# Patient Record
Sex: MALE | Race: WHITE
[De-identification: names, ages, dates, MRNs, and addresses within clinical notes are randomized per-mention and may not be internally consistent; named-entity substitution may affect disease eponyms.]

---

## 2017-12-27 ENCOUNTER — HOSPITAL ENCOUNTER (OUTPATIENT)
Dept: HOSPITAL 75 - PREOP | Age: 62
End: 2017-12-27
Attending: SURGERY
Payer: COMMERCIAL

## 2017-12-27 VITALS — WEIGHT: 185 LBS | HEIGHT: 69 IN | BODY MASS INDEX: 27.4 KG/M2

## 2017-12-27 DIAGNOSIS — L05.91: ICD-10-CM

## 2017-12-27 DIAGNOSIS — Z01.818: Primary | ICD-10-CM

## 2017-12-28 ENCOUNTER — HOSPITAL ENCOUNTER (OUTPATIENT)
Dept: HOSPITAL 75 - SDC | Age: 62
Discharge: HOME | End: 2017-12-28
Attending: SURGERY
Payer: COMMERCIAL

## 2017-12-28 VITALS — WEIGHT: 185 LBS | HEIGHT: 69 IN | BODY MASS INDEX: 27.4 KG/M2

## 2017-12-28 VITALS — SYSTOLIC BLOOD PRESSURE: 166 MMHG | DIASTOLIC BLOOD PRESSURE: 107 MMHG

## 2017-12-28 VITALS — SYSTOLIC BLOOD PRESSURE: 157 MMHG | DIASTOLIC BLOOD PRESSURE: 98 MMHG

## 2017-12-28 VITALS — DIASTOLIC BLOOD PRESSURE: 98 MMHG | SYSTOLIC BLOOD PRESSURE: 157 MMHG

## 2017-12-28 DIAGNOSIS — I10: ICD-10-CM

## 2017-12-28 DIAGNOSIS — Z87.891: ICD-10-CM

## 2017-12-28 DIAGNOSIS — Z79.899: ICD-10-CM

## 2017-12-28 DIAGNOSIS — L05.91: Primary | ICD-10-CM

## 2017-12-28 DIAGNOSIS — K21.9: ICD-10-CM

## 2017-12-28 DIAGNOSIS — G43.909: ICD-10-CM

## 2017-12-28 PROCEDURE — 87081 CULTURE SCREEN ONLY: CPT

## 2017-12-28 RX ADMIN — SODIUM CHLORIDE, SODIUM LACTATE, POTASSIUM CHLORIDE, AND CALCIUM CHLORIDE PRN MLS/HR: 600; 310; 30; 20 INJECTION, SOLUTION INTRAVENOUS at 15:46

## 2017-12-28 RX ADMIN — SODIUM CHLORIDE, SODIUM LACTATE, POTASSIUM CHLORIDE, AND CALCIUM CHLORIDE PRN MLS/HR: 600; 310; 30; 20 INJECTION, SOLUTION INTRAVENOUS at 14:39

## 2017-12-28 NOTE — PROGRESS NOTE-POST OPERATIVE
Post-Operative Progess Note


Surgeon (s)/Assistant (s)


Surgeon


RAÚL MAYEN DO


Assistant:  na





Pre-Operative Diagnosis


cyst pilonidal





Post-Operative Diagnosis





same





Procedure & Operative Findings


Date of Procedure


12/28/17


Procedure Performed/Findings


excision pilonidal cyst 3.2x2.5x2 cm


Anesthesia Type


gen





Estimated Blood Loss


Estimated blood loss (mL):  minimal





Specimens/Packing


Specimens Removed


cyst











RAÚL MAYEN DO Dec 28, 2017 15:31

## 2017-12-28 NOTE — PROGRESS NOTE-PRE OPERATIVE
Pre-Operative Progress Note


H&P Reviewed


The H&P was reviewed, patient examined and no changes noted.


Date Seen by Provider:  Dec 28, 2017


Time Seen by Provider:  14:34


Date H&P Reviewed:  Dec 28, 2017


Time H&P Reviewed:  14:34


Pre-Operative Diagnosis:  cyst pilonidal











RAÚL MAYEN DO Dec 28, 2017 14:35

## 2017-12-28 NOTE — DISCHARGE INST-SIMPLE/STANDARD
Discharge Inst-Standard


Discharge Medications


New, Converted or Re-Newed RX:  RX on Chart





Patient Instructions/Follow Up


Plan of Care/Instructions/FU:  


12-14 days for suture removal


Activity as Tolerated:  No


Discharge Diet:  Regular Diet


Other Inst to Patient


Follow up Appt:


Make appointment for 12-14 days





Instructions:


No lifting greater than 10 pounds.


No strenuous activity. 


May shower in 24 hours, no tub bath or soaking.


Use incentive spirometer at home as directed.


No Smoking





Skin/Wound Care:


Keep area clean and dry.  Remove dressing daily.





Symptoms to Report:


Appetite Changes, Extremity Discoloration, Numbness/Tingling, Swelling Increased

, Bleeding Excessive, Eyesight Changes, Pain Increased, Urine Color Change, 

Constipation(Persistent), Fever over 101 degree F, Pain/Pressure in chest, 

Urinating Difficulty, Cough Up/Vomit Blood, Heart Beat Irreg/Pounding, Pain/

Pressure in jaw, Vaginal Bleeding Increase, Cramps in feet or legs, 

Lightheadedness, Pain/Pressure in shoulder, Diarrhea(Persistent), Memory 

Changes Suddenly, Questions/Concerns, Weight gain consecutive days, Dizziness/

Fainting, Nausea/Vomiting, Shortness of Breath, Weight gain over 2 pounds








If questions or concerns contact your physician 


Or seek help at emergency department.











RAÚL MAYEN DO Dec 28, 2017 15:35

## 2017-12-28 NOTE — OPERATIVE REPORT
DATE OF SERVICE:  12/28/2017



PREOPERATIVE DIAGNOSIS:

Pilonidal cyst.



POSTOPERATIVE DIAGNOSIS:

Pilonidal cyst.



PROCEDURE:

Excision of pilonidal cyst 3.2 x 2.5 x 2 cm with layered closure.



SURGEON:

Raúl Butler DO.



ANESTHESIA:

General.



ESTIMATED BLOOD LOSS:

Minimal.



COMPLICATIONS:

None.



INDICATIONS:

The patient is a 62-year-old male with a pilonidal cyst.  This is causing some

pain and discomfort at times.  Also, has drained several times previously.  He

understands risks and benefits of procedure and wished to proceed with

procedure.  Consent was signed and on the chart.



DESCRIPTION OF PROCEDURE:

The patient was taken to the operating suite.  He was prepped and draped in

sterile fashion in the prone position.  An incision was made just to the left of

midline and the cyst was dissected around with cautery dissection.  Anteriorly,

a small part was ruptured with dissection, but the entirety of it was removed. 

This was taken down to the sacral fascia and removed.  No other part of the cyst

was visualized within the incision or cavity or wound.  The wound was then

irrigated with copious amounts of irrigation.  Hemostasis had been achieved. 

The deep subcutaneous tissues were then reapproximated using 3-0 Vicryl.  The

subcutaneous tissues were reapproximated using 3-0 Vicryl.  Skin was then closed

with vertical mattress 2-0 Prolenes.  The area was then washed and dried,

sterile bandages were applied.  The patient tolerated the procedure well without

any complications and was taken to recovery room in stable condition.





Job ID: [id number]

DocumentID: [id number]

Dictated Date:  12/28/2017 17:16:24

Transcription Date: 12/28/2017 23:06:50

Dictated By: RAÚL BUTLER DO

## 2018-12-17 ENCOUNTER — HOSPITAL ENCOUNTER (OUTPATIENT)
Dept: HOSPITAL 75 - CARD | Age: 63
End: 2018-12-17
Attending: INTERNAL MEDICINE
Payer: COMMERCIAL

## 2018-12-17 VITALS — SYSTOLIC BLOOD PRESSURE: 162 MMHG | DIASTOLIC BLOOD PRESSURE: 109 MMHG

## 2018-12-17 VITALS — SYSTOLIC BLOOD PRESSURE: 154 MMHG | DIASTOLIC BLOOD PRESSURE: 92 MMHG

## 2018-12-17 DIAGNOSIS — R07.9: Primary | ICD-10-CM

## 2018-12-17 DIAGNOSIS — I10: ICD-10-CM

## 2018-12-17 PROCEDURE — 93017 CV STRESS TEST TRACING ONLY: CPT

## 2018-12-17 PROCEDURE — 78452 HT MUSCLE IMAGE SPECT MULT: CPT

## 2018-12-17 NOTE — STRESS TEST
DATE OF SERVICE:  12/17/2018



NUCLEAR MYOVIEW REPORT



REFERRING PHYSICIAN:

Dr. Hernandez.



In summary, the patient was injected with 10.7 mCi of technetium-99 Myoview and

the resting images were obtained.  Then, the patient received a 31.4 mCi of

technetium-99 with peak stress level and the stress images were obtained.  The

stress and resting images were compared in the short axis, horizontal long axis,

and vertical long axis views.  Review of the images showed diaphragmatic

attenuation with mild decreased uptake at the mid to apical inferior wall and

inferolateral wall with subtle reversibility probably due to the extracardiac

attenuation.  SSS is 5, SDS 4, TID value 1.0.  On the gated images, the left

ventricle appeared to be in normal size with mild diffuse left ventricular

hypokinesia, calculated ejection fraction 44%.



CONCLUSION:

1.  Diaphragmatic attenuation with mild decreased uptake at the mid to apical

inferior wall and inferolateral wall with mild reversibility, probably secondary

to the extracardiac attenuation.

2.  Normal left ventricular size with mild diffuse left ventricular hypokinesia,

calculated ejection fraction 44%.





Job ID: [id number]

DocumentID: [id number]

Dictated Date:  12/17/2018 12:42:37

Transcription Date: 12/17/2018 13:05:08

Dictated By: SARAH KIRKLAND MD

## 2019-01-25 ENCOUNTER — HOSPITAL ENCOUNTER (OUTPATIENT)
Dept: HOSPITAL 75 - RAD | Age: 64
End: 2019-01-25
Attending: INTERNAL MEDICINE
Payer: COMMERCIAL

## 2019-01-25 DIAGNOSIS — M54.2: ICD-10-CM

## 2019-01-25 DIAGNOSIS — M41.85: Primary | ICD-10-CM

## 2019-01-25 PROCEDURE — 72040 X-RAY EXAM NECK SPINE 2-3 VW: CPT

## 2019-01-25 PROCEDURE — 72072 X-RAY EXAM THORAC SPINE 3VWS: CPT

## 2019-01-25 NOTE — DIAGNOSTIC IMAGING REPORT
CLINICAL INDICATION: Patient complains of back and neck pain

mostly on the right side. Pain appears to be getting worse.



EXAM: X-ray of the cervical spine, three views including odontoid

views.



COMPARISON: X-ray of the cervical spine dated 06/05/2011.



FINDINGS:



There is no acute fracture or dislocation. There is no

significant change to the small spurs anteriorly at the C5-C6

levels. The intervertebral disc heights are well maintained.

There is no prevertebral soft tissue swelling. Odontoid views are

unremarkable.



IMPRESSION:



Stable minimal cervical spine degenerative disease with minimal

anterior spurring at the C5-C6 level.



  Dictated on workstation # [id number]

## 2019-01-25 NOTE — DIAGNOSTIC IMAGING REPORT
CLINICAL INDICATION: Patient complains of back and neck pain

mostly on right side. Pain is getting worse.



EXAM: X-ray of the thoracic spine, three views including

swimmer's view.



COMPARISON: CT scan of the abdomen and pelvis dated 09/28/2011.



FINDINGS:



Limited visualization of the upper thoracic spine due to

overlapping bone and soft tissue. Thoracic spine shows no acute

fracture or dislocation. Intervertebral disc heights and

vertebral body heights are within normal limits. There is no

significant change to the mild dextrorotoscoliosis of the

thoracolumbar spine. This is best seen on the  image of the

comparison CT scan.



IMPRESSION:

 

1: There is no acute thoracic spine fracture or dislocation.



2: Again seen mild dextrorotoscoliosis of the thoracolumbar

spine.



  Dictated on workstation # [id number]

## 2021-08-11 ENCOUNTER — HOSPITAL ENCOUNTER (OUTPATIENT)
Dept: HOSPITAL 75 - PREOP | Age: 66
Discharge: HOME | End: 2021-08-11
Attending: OTOLARYNGOLOGY
Payer: COMMERCIAL

## 2021-08-11 VITALS — BODY MASS INDEX: 25.96 KG/M2 | WEIGHT: 175.27 LBS | HEIGHT: 69.02 IN

## 2021-08-11 DIAGNOSIS — J38.3: ICD-10-CM

## 2021-08-11 DIAGNOSIS — Z01.812: Primary | ICD-10-CM

## 2021-08-11 DIAGNOSIS — Z01.810: ICD-10-CM

## 2021-08-11 DIAGNOSIS — Z01.811: ICD-10-CM

## 2021-08-11 DIAGNOSIS — Z20.822: ICD-10-CM

## 2021-08-11 PROCEDURE — 87635 SARS-COV-2 COVID-19 AMP PRB: CPT

## 2021-08-12 ENCOUNTER — HOSPITAL ENCOUNTER (OUTPATIENT)
Dept: HOSPITAL 75 - SDC | Age: 66
Discharge: HOME | End: 2021-08-12
Attending: OTOLARYNGOLOGY
Payer: COMMERCIAL

## 2021-08-12 VITALS — SYSTOLIC BLOOD PRESSURE: 152 MMHG | DIASTOLIC BLOOD PRESSURE: 94 MMHG

## 2021-08-12 VITALS — DIASTOLIC BLOOD PRESSURE: 102 MMHG | SYSTOLIC BLOOD PRESSURE: 155 MMHG

## 2021-08-12 VITALS — SYSTOLIC BLOOD PRESSURE: 152 MMHG | DIASTOLIC BLOOD PRESSURE: 102 MMHG

## 2021-08-12 VITALS — DIASTOLIC BLOOD PRESSURE: 96 MMHG | SYSTOLIC BLOOD PRESSURE: 166 MMHG

## 2021-08-12 VITALS — HEIGHT: 69.02 IN | BODY MASS INDEX: 25.96 KG/M2 | WEIGHT: 175.27 LBS

## 2021-08-12 VITALS — DIASTOLIC BLOOD PRESSURE: 108 MMHG | SYSTOLIC BLOOD PRESSURE: 133 MMHG

## 2021-08-12 VITALS — SYSTOLIC BLOOD PRESSURE: 155 MMHG | DIASTOLIC BLOOD PRESSURE: 100 MMHG

## 2021-08-12 VITALS — SYSTOLIC BLOOD PRESSURE: 149 MMHG | DIASTOLIC BLOOD PRESSURE: 96 MMHG

## 2021-08-12 VITALS — DIASTOLIC BLOOD PRESSURE: 96 MMHG | SYSTOLIC BLOOD PRESSURE: 153 MMHG

## 2021-08-12 VITALS — SYSTOLIC BLOOD PRESSURE: 138 MMHG | DIASTOLIC BLOOD PRESSURE: 88 MMHG

## 2021-08-12 VITALS — SYSTOLIC BLOOD PRESSURE: 163 MMHG | DIASTOLIC BLOOD PRESSURE: 106 MMHG

## 2021-08-12 VITALS — DIASTOLIC BLOOD PRESSURE: 95 MMHG | SYSTOLIC BLOOD PRESSURE: 146 MMHG

## 2021-08-12 DIAGNOSIS — Z79.899: ICD-10-CM

## 2021-08-12 DIAGNOSIS — J38.7: Primary | ICD-10-CM

## 2021-08-12 DIAGNOSIS — I10: ICD-10-CM

## 2021-08-12 DIAGNOSIS — K21.9: ICD-10-CM

## 2021-08-12 LAB
BASOPHILS # BLD AUTO: 0.1 10^3/UL (ref 0–0.1)
BASOPHILS NFR BLD AUTO: 1 % (ref 0–10)
BUN/CREAT SERPL: 11
CALCIUM SERPL-MCNC: 9.8 MG/DL (ref 8.5–10.1)
CHLORIDE SERPL-SCNC: 104 MMOL/L (ref 98–107)
CO2 SERPL-SCNC: 25 MMOL/L (ref 21–32)
CREAT SERPL-MCNC: 0.98 MG/DL (ref 0.6–1.3)
EOSINOPHIL # BLD AUTO: 0.1 10^3/UL (ref 0–0.3)
EOSINOPHIL NFR BLD AUTO: 1 % (ref 0–10)
GFR SERPLBLD BASED ON 1.73 SQ M-ARVRAT: 77 ML/MIN
GLUCOSE SERPL-MCNC: 102 MG/DL (ref 70–105)
HCT VFR BLD CALC: 46 % (ref 40–54)
HGB BLD-MCNC: 16 G/DL (ref 13.3–17.7)
LYMPHOCYTES # BLD AUTO: 1.6 10^3/UL (ref 1–4)
LYMPHOCYTES NFR BLD AUTO: 23 % (ref 12–44)
MANUAL DIFFERENTIAL PERFORMED BLD QL: NO
MCH RBC QN AUTO: 31 PG (ref 25–34)
MCHC RBC AUTO-ENTMCNC: 35 G/DL (ref 32–36)
MCV RBC AUTO: 88 FL (ref 80–99)
MONOCYTES # BLD AUTO: 0.6 10^3/UL (ref 0–1)
MONOCYTES NFR BLD AUTO: 9 % (ref 0–12)
NEUTROPHILS # BLD AUTO: 4.5 10^3/UL (ref 1.8–7.8)
NEUTROPHILS NFR BLD AUTO: 65 % (ref 42–75)
PLATELET # BLD: 290 10^3/UL (ref 130–400)
PMV BLD AUTO: 10.3 FL (ref 9–12.2)
POTASSIUM SERPL-SCNC: 3.2 MMOL/L (ref 3.6–5)
SODIUM SERPL-SCNC: 142 MMOL/L (ref 135–145)
WBC # BLD AUTO: 6.9 10^3/UL (ref 4.3–11)

## 2021-08-12 PROCEDURE — 87081 CULTURE SCREEN ONLY: CPT

## 2021-08-12 PROCEDURE — 71046 X-RAY EXAM CHEST 2 VIEWS: CPT

## 2021-08-12 PROCEDURE — 93005 ELECTROCARDIOGRAM TRACING: CPT

## 2021-08-12 PROCEDURE — 85025 COMPLETE CBC W/AUTO DIFF WBC: CPT

## 2021-08-12 PROCEDURE — 36415 COLL VENOUS BLD VENIPUNCTURE: CPT

## 2021-08-12 PROCEDURE — 80048 BASIC METABOLIC PNL TOTAL CA: CPT

## 2021-08-12 NOTE — DIAGNOSTIC IMAGING REPORT
INDICATION: Preoperative evaluation



PA and lateral views of the chest are obtained. There is no

previous study available at this time for comparison.



Heart size and pulmonary vascularity are within normal limits.

Linear density in the right lung base likely represents

atelectasis or scarring. There is no evidence of consolidation or

significant pleural fluid.



IMPRESSION: Minimal basilar atelectasis or scarring without other

evidence for acute abnormality.



Dictated by: 



  Dictated on workstation # [id number]

## 2021-08-12 NOTE — PROGRESS NOTE-PRE OPERATIVE
Pre-Operative Progress Note


H&P Reviewed


The H&P was reviewed, patient examined and no changes noted.


Date Seen by Provider:  Aug 12, 2021


Time Seen by Provider:  07:00


Date H&P Reviewed:  Aug 12, 2021


Time H&P Reviewed:  07:00


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











ELIE SALES MD             Aug 12, 2021 07:04

## 2021-08-12 NOTE — ANESTHESIA-GENERAL POST-OP
General


Patient Condition


Mental Status/LOC:  Same as Preop


Cardiovascular:  Satisfactory


Nausea/Vomiting:  Absent


Respiratory:  Satisfactory


Pain:  Controlled


Complications:  Absent





Post Op Complications


Complications


None





Follow Up Care/Instructions


Patient Instructions


None needed.





Anesthesia/Patient Condition


Patient Condition


Patient is doing well, no complaints, stable vital signs, no apparent adverse 

anesthesia problems.   


No complications reported per nursing.


[place] [facility] [place]  [place]











TIM BRITO CRNA           Aug 12, 2021 13:42

## 2021-08-12 NOTE — PROGRESS NOTE-POST OPERATIVE
Post-Operative Progess Note


Surgeon (s)/Assistant (s)


Surgeon


ELIE SALES MD


Assistant


n/a





Pre-Operative Diagnosis


Leukoplakia of Right Vocal Cord





Post-Operative Diagnosis


same





Post-Op Procedure Note


Date of Procedure:  Aug 12, 2021


Name of Procedure Performed:  


Direct Laryngosocpy with Removal of Right Vocal Cord Lesion


Description & Findings


Description and Findings:





n/a


Anesthesia Type


get


Estimated Blood Loss


minimal


Packing


none.


Specimen(s) collected/removed


right vocal cord lesion











ELIE SALES MD             Aug 12, 2021 11:09